# Patient Record
Sex: FEMALE | Race: BLACK OR AFRICAN AMERICAN | NOT HISPANIC OR LATINO | Employment: UNEMPLOYED | URBAN - METROPOLITAN AREA
[De-identification: names, ages, dates, MRNs, and addresses within clinical notes are randomized per-mention and may not be internally consistent; named-entity substitution may affect disease eponyms.]

---

## 2020-04-21 ENCOUNTER — HOSPITAL ENCOUNTER (EMERGENCY)
Facility: CLINIC | Age: 30
Discharge: HOME | End: 2020-04-21
Attending: EMERGENCY MEDICINE | Admitting: EMERGENCY MEDICINE
Payer: COMMERCIAL

## 2020-04-21 ENCOUNTER — APPOINTMENT (OUTPATIENT)
Dept: GENERAL RADIOLOGY | Facility: CLINIC | Age: 30
End: 2020-04-21
Attending: EMERGENCY MEDICINE
Payer: COMMERCIAL

## 2020-04-21 VITALS
RESPIRATION RATE: 16 BRPM | OXYGEN SATURATION: 100 % | SYSTOLIC BLOOD PRESSURE: 128 MMHG | DIASTOLIC BLOOD PRESSURE: 82 MMHG

## 2020-04-21 DIAGNOSIS — J01.00 SUBACUTE MAXILLARY SINUSITIS: ICD-10-CM

## 2020-04-21 DIAGNOSIS — R06.02 SHORTNESS OF BREATH: ICD-10-CM

## 2020-04-21 DIAGNOSIS — R07.9 CHEST PAIN, UNSPECIFIED TYPE: ICD-10-CM

## 2020-04-21 LAB
ANION GAP SERPL CALCULATED.3IONS-SCNC: 4 MMOL/L (ref 3–14)
BASOPHILS # BLD AUTO: 0 10E9/L (ref 0–0.2)
BASOPHILS NFR BLD AUTO: 0.2 %
BUN SERPL-MCNC: 6 MG/DL (ref 7–30)
CALCIUM SERPL-MCNC: 8.4 MG/DL (ref 8.5–10.1)
CHLORIDE SERPL-SCNC: 108 MMOL/L (ref 94–109)
CO2 SERPL-SCNC: 27 MMOL/L (ref 20–32)
CREAT SERPL-MCNC: 0.47 MG/DL (ref 0.52–1.04)
DIFFERENTIAL METHOD BLD: ABNORMAL
EOSINOPHIL # BLD AUTO: 0.1 10E9/L (ref 0–0.7)
EOSINOPHIL NFR BLD AUTO: 0.9 %
ERYTHROCYTE [DISTWIDTH] IN BLOOD BY AUTOMATED COUNT: 12.9 % (ref 10–15)
GFR SERPL CREATININE-BSD FRML MDRD: >90 ML/MIN/{1.73_M2}
GLUCOSE SERPL-MCNC: 99 MG/DL (ref 70–99)
HCT VFR BLD AUTO: 36.1 % (ref 35–47)
HGB BLD-MCNC: 12.1 G/DL (ref 11.7–15.7)
IMM GRANULOCYTES # BLD: 0 10E9/L (ref 0–0.4)
IMM GRANULOCYTES NFR BLD: 0.1 %
INTERPRETATION ECG - MUSE: NORMAL
LYMPHOCYTES # BLD AUTO: 2.6 10E9/L (ref 0.8–5.3)
LYMPHOCYTES NFR BLD AUTO: 32.4 %
MAGNESIUM SERPL-MCNC: 2.1 MG/DL (ref 1.6–2.3)
MCH RBC QN AUTO: 31.9 PG (ref 26.5–33)
MCHC RBC AUTO-ENTMCNC: 33.5 G/DL (ref 31.5–36.5)
MCV RBC AUTO: 95 FL (ref 78–100)
MONOCYTES # BLD AUTO: 0.6 10E9/L (ref 0–1.3)
MONOCYTES NFR BLD AUTO: 7.9 %
NEUTROPHILS # BLD AUTO: 4.7 10E9/L (ref 1.6–8.3)
NEUTROPHILS NFR BLD AUTO: 58.5 %
NRBC # BLD AUTO: 0 10*3/UL
NRBC BLD AUTO-RTO: 0 /100
PLATELET # BLD AUTO: 228 10E9/L (ref 150–450)
POTASSIUM SERPL-SCNC: 3.6 MMOL/L (ref 3.4–5.3)
RBC # BLD AUTO: 3.79 10E12/L (ref 3.8–5.2)
SODIUM SERPL-SCNC: 139 MMOL/L (ref 133–144)
TROPONIN I SERPL-MCNC: <0.015 UG/L (ref 0–0.04)
WBC # BLD AUTO: 8.1 10E9/L (ref 4–11)

## 2020-04-21 PROCEDURE — 99285 EMERGENCY DEPT VISIT HI MDM: CPT | Mod: 25

## 2020-04-21 PROCEDURE — 71045 X-RAY EXAM CHEST 1 VIEW: CPT

## 2020-04-21 PROCEDURE — 80048 BASIC METABOLIC PNL TOTAL CA: CPT | Performed by: EMERGENCY MEDICINE

## 2020-04-21 PROCEDURE — 83735 ASSAY OF MAGNESIUM: CPT | Performed by: EMERGENCY MEDICINE

## 2020-04-21 PROCEDURE — 93005 ELECTROCARDIOGRAM TRACING: CPT

## 2020-04-21 PROCEDURE — 85025 COMPLETE CBC W/AUTO DIFF WBC: CPT | Performed by: EMERGENCY MEDICINE

## 2020-04-21 PROCEDURE — 84484 ASSAY OF TROPONIN QUANT: CPT | Performed by: EMERGENCY MEDICINE

## 2020-04-21 RX ORDER — IBUPROFEN 600 MG/1
600 TABLET, FILM COATED ORAL 3 TIMES DAILY PRN
Qty: 20 TABLET | Refills: 0 | Status: SHIPPED | OUTPATIENT
Start: 2020-04-21

## 2020-04-21 NOTE — ED AVS SNAPSHOT
Emergency Department  [address] [place] [id number]  Phone:  [phone]  Fax:  [phone]                                    Laly Small   MRN: [id number]    Department:   Emergency Department   Date of Visit:  4/21/2020           After Visit Summary Signature Page    I have received my discharge instructions, and my questions have been answered. I have discussed any challenges I see with this plan with the nurse or doctor.    ..........................................................................................................................................  Patient/Patient Representative Signature      ..........................................................................................................................................  Patient Representative Print Name and Relationship to Patient    ..................................................               ................................................  Date                                   Time    ..........................................................................................................................................  Reviewed by Signature/Title    ...................................................              ..............................................  Date                                               Time          22EPIC Rev 08/18

## 2020-04-21 NOTE — DISCHARGE INSTRUCTIONS
Discharge Instructions  Sinus Infection    You have acute sinusitis, or an infection of the sinuses. The sinuses are the hollow areas within the facial bones that are connected to the nasal opening. The most common cause of acute sinusitis is a virus infection associated with the common cold. Bacterial sinusitis occurs much less commonly, usually as a complication of viral sinusitis. Experts say that most sinusitis is caused by a virus within the first 7-10 days of illness. Antibiotics do nothing to help with virus infections, so most people do not need antibiotics for acute sinusitis.     Generally, every Emergency Department visit should have a follow-up clinic visit with either a primary or a specialty clinic/provider. Please follow-up as instructed by your emergency provider today.    Return to the Emergency Department if:  Your vision changes.  You are confused or have difficulty thinking clearly.  You have swelling around your eye.  You develop a severe headache or neck stiffness.  Your symptoms get worse and you are unable to see your primary provider.      Treatment:  Pain relief -- Non-prescription pain medications, such as Tylenol  (acetaminophen) or Motrin  or Advil  (ibuprofen) are recommended for pain.  Do not use a medicine that you are allergic to, or if your provider has told you not to use it.     Nasal irrigation -- Flushing the nose and sinuses with a saline solution several times per day can help to decrease pain caused by congestion.  Nasal decongestants -- Nasal decongestant sprays, including Afrin  (oxymetazoline) and Francois-Synephrine  (phenylephrine) can be used to temporarily treat congestion. However, these sprays should not be used for more than two to three days due to the risk of rebound congestion (when the nose is congested constantly unless the medication is used repeatedly).  Nasal glucocorticoids -- These are prescription steroids delivered by a nasal spray that can help to reduce  swelling inside the nose, usually within two to three days. These drugs have few side effects and dramatically relieve symptoms in most people.  If you use these in conjunction with Afrin  you will need to use at least 15 minutes prior to the nasal decongestant.    Antibiotic? -- Rarely antibiotics are used along with the above treatments.    If you were given a prescription for medicine here today, be sure to read all of the information (including the package insert) that comes with your prescription.  This will include important information about the medicine, its side effects, and any warnings that you need to know about.  The pharmacist who fills the prescription can provide more information and answer questions you may have about the medicine.  If you have questions or concerns that the pharmacist cannot address, please call or return to the Emergency Department.   Remember that you can always come back to the Emergency Department if you are not able to see your regular provider in the amount of time listed above, if you get any new symptoms, or if there is anything that worries you.

## 2020-04-21 NOTE — ED PROVIDER NOTES
History     Chief Complaint:  Chest pain and shortness of breath      HPI  Laly Small is a 30 year old year old female with a history of narcolepsy and asthma who presents for evaluation of chest pain and worsening sinus symptoms.  Sarath pain started 3 days ago while driving after a run.  Felt like a bubble in the chest.  Recurred the next day feeling like burning which has persisted.  Chronic shortness of breath, has asthma, neb did not help.  No coughing.  Has nasal drainage and sinus pressure.  Was on antibiotics for 3-4 days but misplaced them and symptoms worsened after.  Also on allergy pills and sudafed.  No leg swelling.  No fever, cough, known COVID exposure.  H/o periodic paralysis on potassium supplements.  Noted some abnormal feelings in right side during this time.    Allergies:  No Known Drug Allergies     Medications:   Albuterol   Claritin   Advair diskus   Zoloft  Toradol   Pamelor   Zaditor     Medical History:   Hypokalemic periodic paralysis  Narcolepsy   Asthma     Surgical History   Breast surgery   GYN Surgery     Family History:   Family history reviewed. No pertinent family history.     Social History:  Patient was not accompanied to the ED.  Smoking Status: Negative   Smokeless Tobacco: Negative   Alcohol Use: Negative       Review of Systems  Ten system ROS reviewed and is negative except as above    Physical Exam     Patient Vitals for the past 24 hrs:   BP Resp SpO2   04/21/20 1735 128/82 16 100 %        Physical Exam  Eyes:  Sclera white; Pupils are equal and round  ENT:    External ears and nares normal  CV:  Rate as above with regular rhythm     No BLE edema  Resp:  Breath sounds clear and equal bilaterally, no wheeze  GI:  Abdomen is soft, non-tender  MS:  Moves all extremities  Skin:  Warm and dry  Neuro:  Speech is normal and fluent. Alert.      Emergency Department Course     ECG:  ECG taken at 1752, ECG read at 1806  Normal sinus rhythm with sinus arrhythmia, possible left  atrial enlargement, incomplete RBBB, borderline ECG  Rate 74 bpm. MO interval 156 ms. QRS duration 98 ms. QT/QTc 382/424 ms.  No prior for comparison.  Agree.    Imaging:  Radiology results were communicated with the patient who voiced understanding of the findings.    XR Chest Port 1 View  No acute airspace infiltrate.    PEDRITO MARIE MD    Reading per radiology     Laboratory:  Laboratory findings were communicated with the patient who voiced understanding of the findings.    Troponin: (Collected 1754) <0.015   Magnesium: 2.1    CBC: WBC 8.1, HGB 12.1,   BMP: BUN 6 (L) Calcium 8.4 (L)  (Creatinine 0.47 (L) ) o/w WNL     Emergency Department Course:    1727 Nursing notes and vitals reviewed.   I performed an exam of the patient as documented above.     1754 IV was inserted and blood was drawn for laboratory testing, results above.    1815 The patient was sent for XR while in the emergency department, results above.     1842 Findings and plan explained to the Patient. Patient discharged home with instructions regarding supportive care, medications, and reasons to return. The importance of close follow-up was reviewed. The patient was prescribed as below.    Impression & Plan     Medical Decision Making:  No evidence for dysrhythmia, pericarditis, ACS, myocarditis.  Low risk for ACS and serial troponins and stress testing are not indicated.  No evidence for PTx, pleural effusion, pneumonia.  Worsening reflex is possible and ranitidine prescribed.  COVID considered and appropriate precautions taken.  No respiratory distress or hypoxia and outpatient management is appropriate.  Sinus symptoms may be allergic, viral or bacterial.  New augmentin course given.      Diagnosis:     ICD-10-CM    1. Subacute maxillary sinusitis  J01.00    2. Shortness of breath  R06.02    3. Chest pain, unspecified type  R07.9         Disposition:  Discharged to home.    Discharge Medications:  New Prescriptions     AMOXICILLIN-CLAVULANATE (AUGMENTIN) 875-125 MG TABLET    Take 1 tablet by mouth 2 times daily for 10 days    IBUPROFEN (ADVIL/MOTRIN) 600 MG TABLET    Take 1 tablet (600 mg) by mouth 3 times daily as needed for moderate pain or fever    RANITIDINE (ZANTAC) 150 MG TABLET    Take 1 tablet (150 mg) by mouth 2 times daily       Scribe Disclosure:  I, Monty Dennis, am serving as a scribe at 5:32 PM on 4/21/2020 to document services personally performed by Carmen Duong MD based on my observations and the provider's statements to me.          Carmen Duong MD  04/21/20 [id number]

## 2023-04-06 ENCOUNTER — HOSPITAL ENCOUNTER (EMERGENCY)
Facility: CLINIC | Age: 33
Discharge: HOME | End: 2023-04-06
Attending: EMERGENCY MEDICINE | Admitting: EMERGENCY MEDICINE
Payer: COMMERCIAL

## 2023-04-06 ENCOUNTER — APPOINTMENT (OUTPATIENT)
Dept: GENERAL RADIOLOGY | Facility: CLINIC | Age: 33
End: 2023-04-06
Attending: EMERGENCY MEDICINE
Payer: COMMERCIAL

## 2023-04-06 VITALS
OXYGEN SATURATION: 97 % | RESPIRATION RATE: 20 BRPM | BODY MASS INDEX: 22.2 KG/M2 | TEMPERATURE: 97.3 F | HEIGHT: 64 IN | DIASTOLIC BLOOD PRESSURE: 92 MMHG | HEART RATE: 87 BPM | WEIGHT: 130 LBS | SYSTOLIC BLOOD PRESSURE: 120 MMHG
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
## 2023-04-06 DIAGNOSIS — S61.511A LACERATION OF RIGHT WRIST, INITIAL ENCOUNTER: ICD-10-CM

## 2023-04-06 PROCEDURE — 250N000013 HC RX MED GY IP 250 OP 250 PS 637: Performed by: EMERGENCY MEDICINE

## 2023-04-06 PROCEDURE — 73110 X-RAY EXAM OF WRIST: CPT | Mod: RT

## 2023-04-06 PROCEDURE — 12001 RPR S/N/AX/GEN/TRNK 2.5CM/<: CPT

## 2023-04-06 PROCEDURE — 99284 EMERGENCY DEPT VISIT MOD MDM: CPT

## 2023-04-06 PROCEDURE — 250N000009 HC RX 250: Performed by: EMERGENCY MEDICINE

## 2023-04-06 RX ORDER — ACETAMINOPHEN 325 MG/1
975 TABLET ORAL ONCE
Status: COMPLETED | OUTPATIENT
Start: 2023-04-06 | End: 2023-04-06

## 2023-04-06 RX ORDER — HYDROCODONE BITARTRATE AND ACETAMINOPHEN 5; 325 MG/1; MG/1
1 TABLET ORAL EVERY 6 HOURS PRN
Qty: 10 TABLET | Refills: 0 | Status: SHIPPED | OUTPATIENT
Start: 2023-04-06 | End: 2023-04-09

## 2023-04-06 RX ADMIN — ACETAMINOPHEN 975 MG: 325 TABLET, FILM COATED ORAL at 06:21

## 2023-04-06 RX ADMIN — Medication 3 ML: at 06:21

## 2023-04-06 ASSESSMENT — ACTIVITIES OF DAILY LIVING (ADL): ADLS_ACUITY_SCORE: 35

## 2023-04-06 ASSESSMENT — ENCOUNTER SYMPTOMS
WOUND: 1
AGITATION: 0
MYALGIAS: 1
COLOR CHANGE: 0

## 2023-04-06 NOTE — DISCHARGE INSTRUCTIONS
Please follow-up with your primary care provider and/or specialist regarding your visit to the ER today.    Please return to the emergency department should you experience any of the symptoms we specifically discussed, including but not limited to recurrence or worsening of your symptoms, or development of any new and concerning symptoms.    Your sutures need to come out in 7-10 days. Please have your primary care doctor recheck your wound/suture removal.

## 2023-04-06 NOTE — ED TRIAGE NOTES
[facility]  ED Arrival Note    Arrives through triage. ABC's intact. A &O X4. . Pt arrives with c/o Dog bite to the R wrist. Patient reports that it was from her friends puppy. Bleeding controlled upon arrival.       Visitors during triage: None      Triage Interventions: Direct rooming     Ambulatory: Yes    Meets Stroke Criteria?: No    Meets Trauma Criteria?: No    Shock Index: N/A, for provider reference    Directed to: Main ED    Pronouns: she/her       Triage Assessment     Row Name 04/06/23 0533       Triage Assessment (Adult)    Airway WDL WDL       Respiratory WDL    Respiratory WDL WDL       Skin Circulation/Temperature WDL    Skin Circulation/Temperature WDL X       Cardiac WDL    Cardiac WDL WDL       Peripheral/Neurovascular WDL    Peripheral Neurovascular WDL WDL       Cognitive/Neuro/Behavioral WDL    Cognitive/Neuro/Behavioral WDL WDL

## 2023-04-06 NOTE — ED PROVIDER NOTES
"  History     Chief Complaint:  Dog Bite (/)       The history is provided by the patient.      Laly Small is a 32 year old female with a history of anxiety who presents with a dog bite to the right wrist by her friend's full grown large-breed dog.  She states that she was with a friend whose larger pitbull mix puppy became aggressive towards patient's younger puppy.  She reports she was trying to separate the larger dog from her puppy when she was bit in her right wrist.  The friend's dog who bit her is up to date on vaccinations.  Her last tetanus was 6/24/22.  Patient denies any additional bite injuries.    Independent Historian:   None - Patient Only    Review of External Notes: 2/28/23 office visit note    ROS:  Review of Systems   Musculoskeletal: Positive for myalgias (Right wrist pain).   Skin: Positive for wound (dog bite to right wrist). Negative for color change and pallor.   Psychiatric/Behavioral: Negative for agitation and behavioral problems.   All other systems reviewed and are negative.      Allergies:  No Known Allergies     Medications:    Adderall  Nexplanon  Omeprazole     Past Medical History:    Narcolepsy   Asthma   ADHD  Depression  Anxiety    Past Surgical History:    Breast reduction  Hysteroscopic removal of intrauterine device     Social History:  The patient presents alone     Physical Exam     Patient Vitals for the past 24 hrs:   BP Temp Temp src Pulse Resp SpO2 Height Weight   04/06/23 0531 (!) 120/92 97.3  F (36.3  C) Oral 87 20 97 % 1.626 m (5' 4\") 59 kg (130 lb)        Physical Exam  Constitutional:       Appearance: Normal appearance.      General: Not in acute distress.  HENT:      Head: Normocephalic and atraumatic.   Eyes:      Extraocular Movements: Extraocular movements intact.      Conjunctiva/sclera: Conjunctivae normal.   Cardiovascular:      Rate and Rhythm: Normal rate and regular rhythm.   Pulmonary:      Effort: Pulmonary effort is normal. No respiratory " distress.   Abdominal:      General: Abdomen is flat. There is no distension.   Musculoskeletal:         Right wrist: Small less than 1 cm superficial laceration to the radial aspect of the ventral right wrist.  No evidence of tendon injury, but exposure to subcutaneous fascial layer. No foreign body. Minimal bleeding. Normal range of motion of right wrist and right hand.  2+ right radial pulse.  Distal fingers cap refill less than 2 seconds. Able to make okay sign, thumbs up, show number two, finger cross, and flex/extend/radial and ulnar deviate wrist. Sensation intact in right hand.     Cervical back: Normal range of motion. No rigidity.   Skin:     Coloration: Skin is not jaundiced or pale.   Neurological:      General: No focal deficit present.      Mental Status: Alert and oriented to person, place, and time.   Psychiatric:         Mood and Affect: Mood normal.         Behavior: Behavior normal.    Emergency Department Course     Imaging:  XR Wrist Right G/E 3 Views   Final Result   IMPRESSION: Normal joint spaces and alignment. No fracture. There is a wound anteriorly and gas in the soft tissues. No radiopaque foreign body.         Report per radiology     Procedures      Laceration Repair      Procedure: Laceration Repair    Indication: Laceration    Consent: Verbal    Location: Right Wrist     Length: 1 cm    Preparation: Extensive irrigation with Tap Water, Sterile Saline and Pressure device utilized. Wound cleanser spray. Patient arm was placed in running tap water and subsequently washed out with >500mL sterile normal saline in addition to wound cleanser spray.    Anesthesia/Sedation: Lidocaine - 1%      Treatment/Exploration: Wound explored, no foreign bodies found     Closure: The wound was loosely approximated with one layer. Skin/superficial layer was closed with 1 x 4-0 Nylon using Interrupted sutures.      Wound Care: Wound dressed with antibiotic ointment and sterile gauze.    Patient Status: The  patient tolerated the procedure well: Yes. There were no complications.      Emergency Department Course & Assessments:    Interventions:  Medications   acetaminophen (TYLENOL) tablet 975 mg (975 mg Oral $Given 23)   lido-EPINEPHrine-tetracaine (LET) topical gel GEL (3 mLs Topical $Given 23)        Independent Interpretation (X-rays, CTs, rhythm strip):  None    Assessments/Consultations/Discussion of Management or Tests:  ED Course as of 23 1658   Thu 2023   0609 Initial assessment   0706 XR Wrist Right G/E 3 Views  Neg fracture   0732 I reassessed the patient and explained findings.   0747 I performed a laceration repair.   0810 Patient updated on discharge instructions and return precautions.  Patient will follow up with primary care provider return to the ER for suture removal in 7 to 10 days.  Patient is also to follow-up with primary care doctor for wound reevaluation given high risk wound after dog bite.  Patient is discharged with Augmentin and p.o. Norco for pain control.  Advised for patient to continue to keep area clean in addition to application of triple antibiotic ointment for dressing.  Patient voiced understanding and agreement with plan.  Answered all questions.       Social Determinants of Health affecting care:   None    Disposition:  The patient was discharged to home.     Impression & Plan    CMS Diagnoses: None    Medical Decision Makin-year-old female as described above presents to the emergency department with dog bite from domestic dog that is vaccinated in the right wrist while trying to break up an altercation between her puppy and her friend's puppy.  Patient hemodynamically stable at time evaluation.  Afebrile.  No evidence of neurovascular injury to the right upper extremity.  Will order plain film wrist x-ray for evaluation for underlying fractures or retained foreign body.  Patient is up-to-date on tetanus vaccination.  After shared decision  discussion with patient regarding laceration repair versus healing by secondary intention.  Given patient's wound is in a relatively higher tension area with significant gaping and exposure of underlying fascial layer, if no obvious fractures requiring intervention, will plan for extensive ED wound washout and loose single suture wound approximation given risk of infection due to dog bite injury. Patient will be discharged with prophylactic Augmentin and close outpatient follow-up for wound reevaluation and suture removal.  Sutures to be removed in 7 to 10 days. Discussed care plan with patient who voiced understanding and agreement with plan.  Answered all questions.  Additional work-up and orders as listed in chart.    Please refer to ED course above for details on the patient's treatment course and any changes or updates in care plan beyond my initial evaluation and MDM.    Diagnosis:    ICD-10-CM    1. Dog bite, initial encounter  W54.0XXA     Right wrist      2. Laceration of right wrist, initial encounter  S61.511A            Discharge Medications:  Discharge Medication List as of 4/6/2023  8:28 AM      START taking these medications    Details   amoxicillin-clavulanate (AUGMENTIN) 875-125 MG tablet Take 1 tablet by mouth 2 times daily for 7 days, Disp-14 tablet, R-0, E-Prescribe      HYDROcodone-acetaminophen (NORCO) 5-325 MG tablet Take 1 tablet by mouth every 6 hours as needed for severe pain, Disp-10 tablet, R-0, E-Prescribe              Scribe Disclosure:  Mar FALCON, am serving as a scribe at 6:16 AM on 4/6/2023 to document services personally performed by Reese Barth DO based on my observations and the provider's statements to me.     4/6/2023   Reese Barth DO Yeh, Ferris, DO  04/06/23 170

## 2023-10-04 ENCOUNTER — HOSPITAL ENCOUNTER (EMERGENCY)
Facility: CLINIC | Age: 33
Discharge: HOME | End: 2023-10-05
Attending: EMERGENCY MEDICINE | Admitting: EMERGENCY MEDICINE
Payer: COMMERCIAL

## 2023-10-04 VITALS
DIASTOLIC BLOOD PRESSURE: 54 MMHG | BODY MASS INDEX: 23.17 KG/M2 | TEMPERATURE: 98.2 F | WEIGHT: 135 LBS | SYSTOLIC BLOOD PRESSURE: 109 MMHG | OXYGEN SATURATION: 100 % | RESPIRATION RATE: 16 BRPM | HEART RATE: 75 BPM

## 2023-10-04 DIAGNOSIS — E23.6 EMPTY SELLA (H): ICD-10-CM

## 2023-10-04 DIAGNOSIS — S06.0XAA CLOSED HEAD INJURY WITH CONCUSSION, WITH UNKNOWN LOSS OF CONSCIOUSNESS STATUS, INITIAL ENCOUNTER: ICD-10-CM

## 2023-10-04 DIAGNOSIS — S16.1XXA CERVICAL STRAIN, INITIAL ENCOUNTER: ICD-10-CM

## 2023-10-04 PROCEDURE — 250N000013 HC RX MED GY IP 250 OP 250 PS 637: Performed by: EMERGENCY MEDICINE

## 2023-10-04 PROCEDURE — 99284 EMERGENCY DEPT VISIT MOD MDM: CPT | Mod: 25

## 2023-10-04 RX ORDER — ACETAMINOPHEN 325 MG/1
975 TABLET ORAL ONCE
Status: COMPLETED | OUTPATIENT
Start: 2023-10-04 | End: 2023-10-04

## 2023-10-04 RX ORDER — IBUPROFEN 600 MG/1
600 TABLET, FILM COATED ORAL ONCE
Status: COMPLETED | OUTPATIENT
Start: 2023-10-04 | End: 2023-10-04

## 2023-10-04 RX ADMIN — IBUPROFEN 600 MG: 600 TABLET ORAL at 23:38

## 2023-10-04 RX ADMIN — ACETAMINOPHEN 975 MG: 325 TABLET, FILM COATED ORAL at 23:38

## 2023-10-04 ASSESSMENT — ACTIVITIES OF DAILY LIVING (ADL): ADLS_ACUITY_SCORE: 33

## 2023-10-05 ENCOUNTER — APPOINTMENT (OUTPATIENT)
Dept: CT IMAGING | Facility: CLINIC | Age: 33
End: 2023-10-05
Attending: EMERGENCY MEDICINE
Payer: COMMERCIAL

## 2023-10-05 PROCEDURE — 72125 CT NECK SPINE W/O DYE: CPT

## 2023-10-05 PROCEDURE — 70450 CT HEAD/BRAIN W/O DYE: CPT

## 2023-10-05 ASSESSMENT — ACTIVITIES OF DAILY LIVING (ADL): ADLS_ACUITY_SCORE: 35
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
## 2023-10-05 NOTE — DISCHARGE INSTRUCTIONS
Discharge Instructions  Head Injury    You have been seen today for a head injury. Your evaluation included a history and physical examination. You may have had a CT (CAT) scan performed, though most head injuries do not require a scan. Based on this evaluation, your provider today does not feel that your head injury is serious.    Generally, every Emergency Department visit should have a follow-up clinic visit with either a primary or a specialty clinic/provider. Please follow-up as instructed by your emergency provider today.  Return to the Emergency Department if:  You are confused or you are not acting right.  Your headache gets worse or you start to have a really bad headache even with your recommended treatment plan.  You vomit (throw up) more than once.  You have a seizure.  You have trouble walking.  You have weakness or paralysis (cannot move) in an arm or a leg.  You have blood or fluid coming from your ears or nose.  You have new symptoms or anything that worries you.    Sleeping:  It is okay for you to sleep, but someone should wake you up if instructed by your provider, and someone should check on you at your usual time to wake up.     Activity:  Do not drive for at least 24 hours.  Do not drive if you have dizzy spells or trouble concentrating, or remembering things.  Do not return to any contact sports until cleared by your regular provider.     MORE INFORMATION:    Concussion:  A concussion is a minor head injury that may cause temporary problems with the way the brain works. Although concussions are important, they are generally not an emergency or a reason that a person needs to be hospitalized. Some concussion symptoms include confusion, amnesia (forgetful), nausea (sick to your stomach) and vomiting (throwing up), dizziness, fatigue, memory or concentration problems, irritability and sleep problems. For most people, concussions are mild and temporary but some will have more severe and persistent  symptoms that require on-going care and treatment.  CT Scans: Your evaluation today may have included a CT scan (CAT scan) to look for things like bleeding or a skull fracture (broken bone).  CT scans involve radiation and too many CT scans can cause serious health problems like cancer, especially in children.  Because of this, your provider may not have ordered a CT scan today if they think you are at low risk for a serious or life threatening problem.    If you were given a prescription for medicine here today, be sure to read all of the information (including the package insert) that comes with your prescription.  This will include important information about the medicine, its side effects, and any warnings that you need to know about.  The pharmacist who fills the prescription can provide more information and answer questions you may have about the medicine.  If you have questions or concerns that the pharmacist cannot address, please call or return to the Emergency Department.     Remember that you can always come back to the Emergency Department if you are not able to see your regular provider in the amount of time listed above, if you get any new symptoms, or if there is anything that worries you.     Discharge Instructions  Neck Strain    You have been seen today for a neck sprain or strain.  Neck strains usually result from an injury to the neck. Car accidents, contact sports, and falls are common causes of neck strain. Sometimes your neck can start to hurt because of increased activity, muscle tension, an abnormal sleeping position, or because of other problems like arthritis in the neck.     Neck pain usually comes from injured muscles and ligaments. Sometimes there is a herniated ( slipped ) disc. We do not usually do MRI scans to look for these right away, since most herniated discs will get better on their own with time. Today, we did not find any evidence that your neck pain was caused by a serious or  dangerous condition. However, sometimes symptoms develop over time and cannot be found during an emergency visit, so it is very important that you follow up with your primary provider.    Generally, every Emergency Department visit should have a follow-up clinic visit with either a primary or a specialty clinic/provider. Please follow-up as instructed by your emergency provider today.    Return to the Emergency Department if:  You have increasing pain in your neck.  You develop difficulty swallowing or breathing.  You have numbness, weakness, or trouble moving your arms or legs.  You have severe dizziness and difficulty walking.  You are unable to control your bladder or bowels.  You develop severe headache or ringing in the ears.    What can I do to help myself at home?  If you had an injury, use cold for the first 1-2 days. Cold helps relieve pain and reduce inflammation.  Apply ice packs to the neck or areas of pain every 1-2 hours for 20 minutes at a time. Place a towel or cloth between your skin and the ice pack.  After the first 2 days, using heat can help with neck pain and stiffness. You may use a warm shower or bath, warm towels on the neck, or a heating pad. Do not sleep with a heating pad, as you can be burned.   Pain medications - You may take a pain medication such as Tylenol  (acetaminophen), Advil  and Motrin  (ibuprofen), or Aleve  (naproxen).  It is usually best to rest the neck for 1-2 days after an injury, then start gentle stretching exercises.   It is helpful to place a small pillow under the nape of your neck to provide proper neutral positioning.   You should stay active and do your usual work as much as you can, unless this involves heavy physical labor. Ask your provider if you need work restrictions.  If you were given a prescription for medicine here today, be sure to read all of the information (including the package insert) that comes with your prescription.  This will include important  information about the medicine, its side effects, and any warnings that you need to know about.  The pharmacist who fills the prescription can provide more information and answer questions you may have about the medicine.  If you have questions or concerns that the pharmacist cannot address, please call or return to the Emergency Department.   Remember that you can always come back to the Emergency Department if you are not able to see your regular provider in the amount of time listed above, if you get any new symptoms, or if there is anything that worries you.     Discharge Instructions  Incidental Findings  Empty sella turcica    An incidental finding is something unexpected that was found while you were being treated and is felt to not be related to the reason that you came to the Emergency Department.  While this finding is not an emergency, you need to follow up with your primary provider (or occasionally a specialist) to determine if anything should be done about it.    These findings can come from:  Checking your vital signs (example: high blood pressure).  Taking your history (example: unexplained weight loss).  The physical exam (example: a heart murmur).  Laboratory study (example: anemia or low blood count).  X-rays/ultrasound/CT or other imaging (example: an unexplained mass).    Generally, every Emergency Department visit should have a follow-up clinic visit with either a primary or a specialty clinic/provider. Please follow-up as instructed by your emergency provider today.    Return to the Emergency Department if:  Your condition worsens.  You develop unexpected pain.  You now develop new symptoms or have new concerns.  If you were given a prescription for medicine here today, be sure to read all of the information (including the package insert) that comes with your prescription.  This will include important information about the medicine, its side effects, and any warnings that you need to know  about.  The pharmacist who fills the prescription can provide more information and answer questions you may have about the medicine.  If you have questions or concerns that the pharmacist cannot address, please call or return to the Emergency Department.   Remember that you can always come back to the Emergency Department if you are not able to see your regular provider in the amount of time listed above, if you get any new symptoms, or if there is anything that worries you.
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
## 2024-10-29 ENCOUNTER — TRANSCRIBE ORDERS (OUTPATIENT)
Dept: OTHER | Age: 34
End: 2024-10-29

## 2024-10-29 DIAGNOSIS — G93.2 IIH (IDIOPATHIC INTRACRANIAL HYPERTENSION): Primary | ICD-10-CM

## 2024-10-30 ENCOUNTER — TELEPHONE (OUTPATIENT)
Dept: OPHTHALMOLOGY | Facility: CLINIC | Age: 34
End: 2024-10-30
Payer: COMMERCIAL

## 2024-10-30 NOTE — TELEPHONE ENCOUNTER
We received a urgent referral for Laly - I did not see notes -     Called and spoke to a RN at [facility]     I had to call Tomyina to get more information why this was urgent-Referring is a OBGYN - ok for me to schedule with Dr. Maurice per [place]      IIH 24 weeks and 2 days gestational - experiencing worsening symptoms - Dizziness / head aches / requesting a MRX    Scheduled this Friday with Dr. Maurice     Provided the clinic address     Wait time     Billing . Insurance     Road construction     Parking / cost     Date / time / provider -    Brooke Rome Communication Facilitator on 10/30/2024 at 11:47 AM

## 2024-10-30 NOTE — TELEPHONE ENCOUNTER
[facility] Call Center    Phone Message    May a detailed message be left on voicemail: yes     Reason for Call: Appointment Intake    Referring Provider Name: Lea Cordova affiliated with [facility]  Diagnosis and/or Symptoms: IIH (idiopathic intracranial hypertension) [G93.2]    Sent ASAP    Thank you,    Action Taken: Message routed to:  [facility]    Travel Screening: Not Applicable     Date of Service: